# Patient Record
Sex: MALE | Race: WHITE | NOT HISPANIC OR LATINO | Employment: OTHER | URBAN - METROPOLITAN AREA
[De-identification: names, ages, dates, MRNs, and addresses within clinical notes are randomized per-mention and may not be internally consistent; named-entity substitution may affect disease eponyms.]

---

## 2019-11-06 ENCOUNTER — OFFICE VISIT (OUTPATIENT)
Dept: URGENT CARE | Facility: CLINIC | Age: 69
End: 2019-11-06
Payer: MEDICARE

## 2019-11-06 VITALS — OXYGEN SATURATION: 98 %

## 2019-11-06 DIAGNOSIS — S62.639A CLOSED AVULSION FRACTURE OF DISTAL PHALANX OF FINGER, INITIAL ENCOUNTER: Primary | ICD-10-CM

## 2019-11-06 DIAGNOSIS — M20.012 MALLET FINGER OF LEFT HAND: ICD-10-CM

## 2019-11-06 PROCEDURE — 99203 PR OFFICE/OUTPT VISIT, NEW, LEVL III, 30-44 MIN: ICD-10-PCS | Mod: S$GLB,,, | Performed by: FAMILY MEDICINE

## 2019-11-06 PROCEDURE — 73140 XR FINGER 2 OR MORE VIEWS LEFT: ICD-10-PCS | Mod: FY,LT,S$GLB, | Performed by: RADIOLOGY

## 2019-11-06 PROCEDURE — 99203 OFFICE O/P NEW LOW 30 MIN: CPT | Mod: S$GLB,,, | Performed by: FAMILY MEDICINE

## 2019-11-06 PROCEDURE — 73140 X-RAY EXAM OF FINGER(S): CPT | Mod: FY,LT,S$GLB, | Performed by: RADIOLOGY

## 2019-11-06 NOTE — PATIENT INSTRUCTIONS
Mallet Finger  You have an injury to your finger causing the tip of your finger to droop down. This makes your finger look like a small hammer or mallet. This is why its given this name. It is also called baseball finger. This injury happens when the tendon that holds the finger straight at the last joint tears. Sometimes a small break happens where this tendon attaches to the bone.  This causes local pain, swelling, and bruising. This injury usually takes about 4 to 6 weeks to heal. This injury is often treated with a special finger splint called a stack splint. It holds the tendon in the correct position. But even with right treatment, it may not be possible to fully straighten that joint after the injury heals. After healing, the joint will be stiff but usually recovers flexibility over time.  Home care  · Keep your arm elevated to reduce pain and swelling. When sitting or lying down elevate your arm above the level of your heart. You can do this by placing your arm on a pillow that rests on your chest or on a pillow at your side. This is most important during the first 48 hours after the injury.  · Put an ice pack over the injured area for 15 to 20 minutes every 3 to 6 hours. You should do this for the first 24 to 48 hours. You can make an ice pack by filling a plastic bag that seals at the top with ice cubes and then wrapping it with a thin towel. Be careful not to injure your skin with the ice treatments. Ice should never be applied directly to skin. Continue the use of ice packs for relief of pain and swelling as needed. After 48 hours, apply heat (warm shower or warm bath) for 15 to 20 minutes several times a day, or alternate ice and heat.  · If a splint was applied, keep it in place for the time advised. If you remove it too soon, it will cause the position of the tendon to change and the finger joint will heal in a bent position.  · You may use over-the-counter pain medicine to control pain, unless  another pain medicine was prescribed.Talk with your healthcare provider before using these medicines if you have chronic liver or kidney disease or ever had a stomach ulcer or GI bleeding.  · Most patients can return to normal activity while wearing the splint. Discuss any limitations with your healthcare provider.  Follow-up care  Follow up with your healthcare provider, or as advised.  If X-rays were taken, you will be told of any new findings that may affect your care.   When to seek medical advice  Call your healthcare provider right away if any of the following occur:  · Pain or swelling in the injured finger increases  · The finger becomes red or warm   · Injured finger becomes cold, blue, numb, or tingly  Date Last Reviewed: 11/23/2015  © 7780-9576 [employer]. [address] [place] [address], [place] [address]. All rights reserved. This information is not intended as a substitute for professional medical care. Always follow your healthcare professional's instructions.      KEEP THE SPLINT IN PLACE AT ALL TIMES, UNTIL YOU SEE THE HAND SPECIALIST.    I SUBMITTED AN EXPEDITED REFERRAL FOR YOU TO SEE THE HAND SPECIALIST.  IF YOU DO NOT HEAR FROM THEM, CALL [phone].      Make sure that you follow up with your primary care doctor in the next 2-5 days if needed .  Return to the Urgent Care if signs or symptoms change and certainly if you have worsening symptoms go to the nearest emergency department for further evaluation.

## 2019-11-06 NOTE — PROGRESS NOTES
Subjective:       Patient ID: Michael Veras is a 69 y.o. male.    Vitals:  height is 6' (1.829 m) (pended) and weight is 97.5 kg (215 lb) (pended). His tympanic temperature is 98.2 °F (36.8 °C) (pended). His blood pressure is 124/80 (pended) and his pulse is 90 (pended). His respiration is 18 (pended) and oxygen saturation is 98%.     Chief Complaint: Hand Pain    Patient presentS today with history left middle finger pain. Patient states he went was picking up something outside and a shooting pain went down left middle finger occurred an hour ago. Patient denies any pain now .  No other injury    Hand Pain    The incident occurred 1 to 3 hours ago. The incident occurred at home. The pain is present in the left fingers. The quality of the pain is described as aching and shooting. The patient is experiencing no pain. Pertinent negatives include no chest pain.       Constitution: Negative for chills, fatigue and fever.   HENT: Negative for congestion and sore throat.    Neck: Negative for painful lymph nodes.   Cardiovascular: Negative for chest pain and leg swelling.   Eyes: Negative for double vision and blurred vision.   Respiratory: Negative for cough and shortness of breath.    Gastrointestinal: Negative for nausea, vomiting and diarrhea.   Genitourinary: Negative for dysuria, frequency and urgency.   Musculoskeletal: Negative for joint pain, joint swelling, muscle cramps and muscle ache.   Skin: Negative for color change, pale and rash.   Allergic/Immunologic: Negative for seasonal allergies.   Neurological: Negative for dizziness, history of vertigo, light-headedness, passing out and headaches.   Hematologic/Lymphatic: Negative for swollen lymph nodes, easy bruising/bleeding and history of blood clots. Does not bruise/bleed easily.   Psychiatric/Behavioral: Negative for nervous/anxious, sleep disturbance and depression. The patient is not nervous/anxious.        Objective:      Physical Exam   Constitutional:  He is oriented to person, place, and time. He appears well-developed and well-nourished. No distress.   HENT:   Head: Normocephalic and atraumatic.   Eyes: Pupils are equal, round, and reactive to light. EOM are normal.   Neck: Normal range of motion.   Cardiovascular: Normal rate, regular rhythm and normal heart sounds.   Pulmonary/Chest: Effort normal and breath sounds normal. No stridor. No respiratory distress. He has no wheezes.   Musculoskeletal: He exhibits no edema or tenderness.   Mallet finger deformity noted, left middle finger.  Unable to extend distal phalanx  Changes of DJD noted to all fingers.   Neurological: He is alert and oriented to person, place, and time.   Skin: Skin is warm and not diaphoretic.   Psychiatric: He has a normal mood and affect. His behavior is normal. Thought content normal.   Vitals reviewed.      XRAY: There is a small chip fracture from the proximal aspect of the distal phalanx of the 3rd digit.  There is a flexion deformity suggesting extensor tendon injury.  Assessment:       1. Closed avulsion fracture of distal phalanx of finger, initial encounter    2. Mallet finger of left hand        Plan:         Closed avulsion fracture of distal phalanx of finger, initial encounter    Mallet finger of left hand  -     X-Ray Finger 2 or More Views Left; Future; Expected date: 11/06/2019  -     Ambulatory referral to Hand Surgery    KEEP THE SPLINT IN PLACE AT ALL TIMES, UNTIL YOU SEE THE HAND SPECIALIST.    I SUBMITTED AN EXPEDITED REFERRAL FOR YOU TO SEE THE HAND SPECIALIST.  IF YOU DO NOT HEAR FROM THEM, CALL [phone].      Make sure that you follow up with your primary care doctor in the next 2-5 days if needed .  Return to the Urgent Care if signs or symptoms change and certainly if you have worsening symptoms go to the nearest emergency department for further evaluation.

## 2019-11-07 ENCOUNTER — TELEPHONE (OUTPATIENT)
Dept: ORTHOPEDICS | Facility: CLINIC | Age: 69
End: 2019-11-07

## 2020-03-25 ENCOUNTER — HOSPITAL ENCOUNTER (EMERGENCY)
Facility: HOSPITAL | Age: 70
Discharge: HOME | End: 2020-03-26
Attending: EMERGENCY MEDICINE
Payer: MEDICARE

## 2020-03-25 DIAGNOSIS — F10.10 ALCOHOL ABUSE: ICD-10-CM

## 2020-03-25 DIAGNOSIS — F10.920 ALCOHOLIC INTOXICATION WITHOUT COMPLICATION: Primary | ICD-10-CM

## 2020-03-25 LAB
ERYTHROCYTE [DISTWIDTH] IN BLOOD BY AUTOMATED COUNT: 12.5 % (ref 11.5–14.5)
ETHANOL SERPL-MCNC: 137 MG/DL
HCT VFR BLD AUTO: 42.9 % (ref 40–54)
HGB BLD-MCNC: 14.3 G/DL (ref 14–18)
MCH RBC QN AUTO: 34.5 PG (ref 27–31)
MCHC RBC AUTO-ENTMCNC: 33.3 G/DL (ref 32–36)
MCV RBC AUTO: 103 FL (ref 82–98)
PLATELET # BLD AUTO: 162 K/UL (ref 150–350)
PMV BLD AUTO: 10.3 FL (ref 9.2–12.9)
RBC # BLD AUTO: 4.15 M/UL (ref 4.6–6.2)
WBC # BLD AUTO: 5.47 K/UL (ref 3.9–12.7)

## 2020-03-25 PROCEDURE — 80320 DRUG SCREEN QUANTALCOHOLS: CPT

## 2020-03-25 PROCEDURE — 99284 EMERGENCY DEPT VISIT MOD MDM: CPT

## 2020-03-25 PROCEDURE — 80053 COMPREHEN METABOLIC PANEL: CPT

## 2020-03-25 PROCEDURE — 85027 COMPLETE CBC AUTOMATED: CPT

## 2020-03-26 VITALS
HEART RATE: 92 BPM | WEIGHT: 185 LBS | OXYGEN SATURATION: 99 % | TEMPERATURE: 99 F | SYSTOLIC BLOOD PRESSURE: 165 MMHG | RESPIRATION RATE: 20 BRPM | HEIGHT: 74 IN | BODY MASS INDEX: 23.74 KG/M2 | DIASTOLIC BLOOD PRESSURE: 75 MMHG

## 2020-03-26 LAB
ALBUMIN SERPL BCP-MCNC: 4 G/DL (ref 3.5–5.2)
ALP SERPL-CCNC: 66 U/L (ref 55–135)
ALT SERPL W/O P-5'-P-CCNC: 46 U/L (ref 10–44)
AMPHET+METHAMPHET UR QL: NEGATIVE
ANION GAP SERPL CALC-SCNC: 16 MMOL/L (ref 8–16)
AST SERPL-CCNC: 101 U/L (ref 10–40)
BARBITURATES UR QL SCN>200 NG/ML: NEGATIVE
BENZODIAZ UR QL SCN>200 NG/ML: NORMAL
BILIRUB SERPL-MCNC: 0.5 MG/DL (ref 0.1–1)
BILIRUB UR QL STRIP: NEGATIVE
BUN SERPL-MCNC: 8 MG/DL (ref 8–23)
BZE UR QL SCN: NEGATIVE
CALCIUM SERPL-MCNC: 9.4 MG/DL (ref 8.7–10.5)
CANNABINOIDS UR QL SCN: NEGATIVE
CHLORIDE SERPL-SCNC: 109 MMOL/L (ref 95–110)
CLARITY UR: CLEAR
CO2 SERPL-SCNC: 18 MMOL/L (ref 23–29)
COLOR UR: YELLOW
CREAT SERPL-MCNC: 0.8 MG/DL (ref 0.5–1.4)
CREAT UR-MCNC: 114.6 MG/DL (ref 23–375)
EST. GFR  (AFRICAN AMERICAN): >60 ML/MIN/1.73 M^2
EST. GFR  (NON AFRICAN AMERICAN): >60 ML/MIN/1.73 M^2
ETHANOL SERPL-MCNC: 15 MG/DL
GLUCOSE SERPL-MCNC: 75 MG/DL (ref 70–110)
GLUCOSE UR QL STRIP: NEGATIVE
HGB UR QL STRIP: NEGATIVE
KETONES UR QL STRIP: NEGATIVE
LEUKOCYTE ESTERASE UR QL STRIP: NEGATIVE
METHADONE UR QL SCN>300 NG/ML: NEGATIVE
NITRITE UR QL STRIP: NEGATIVE
OPIATES UR QL SCN: NEGATIVE
PCP UR QL SCN>25 NG/ML: NEGATIVE
PH UR STRIP: 6 [PH] (ref 5–8)
POTASSIUM SERPL-SCNC: 4.8 MMOL/L (ref 3.5–5.1)
PROT SERPL-MCNC: 8 G/DL (ref 6–8.4)
PROT UR QL STRIP: NEGATIVE
SODIUM SERPL-SCNC: 143 MMOL/L (ref 136–145)
SP GR UR STRIP: 1.02 (ref 1–1.03)
TOXICOLOGY INFORMATION: NORMAL
URN SPEC COLLECT METH UR: NORMAL
UROBILINOGEN UR STRIP-ACNC: NEGATIVE EU/DL

## 2020-03-26 PROCEDURE — 80307 DRUG TEST PRSMV CHEM ANLYZR: CPT

## 2020-03-26 PROCEDURE — 80320 DRUG SCREEN QUANTALCOHOLS: CPT

## 2020-03-26 PROCEDURE — 25000003 PHARM REV CODE 250: Performed by: EMERGENCY MEDICINE

## 2020-03-26 PROCEDURE — 81003 URINALYSIS AUTO W/O SCOPE: CPT | Mod: 59

## 2020-03-26 RX ORDER — FOLIC ACID 1 MG/1
1 TABLET ORAL DAILY
Status: DISCONTINUED | OUTPATIENT
Start: 2020-03-26 | End: 2020-03-26 | Stop reason: HOSPADM

## 2020-03-26 RX ORDER — THIAMINE HCL 100 MG
100 TABLET ORAL DAILY
Status: DISCONTINUED | OUTPATIENT
Start: 2020-03-26 | End: 2020-03-26 | Stop reason: HOSPADM

## 2020-03-26 RX ADMIN — FOLIC ACID 1 MG: 1 TABLET ORAL at 04:03

## 2020-03-26 RX ADMIN — Medication 100 MG: at 04:03
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
## 2020-03-26 NOTE — ED NOTES
Pt placed in paperscrubs.  Security called to wand pt's belongings. Sitter in direct visual contact of patient.

## 2020-03-26 NOTE — ED NOTES
Security at bedside wanding patient's belongings.  Pt informed we needed a urine specimen.  Verbalized understanding.

## 2020-03-26 NOTE — ED NOTES
House supervisor called to see they have a  on call now.  House supervisor states they do not.  Dr. Prado notified.

## 2020-03-26 NOTE — ED NOTES
Patient identifiers for Michael Veras checked and correct.  LOC: The patient is awake, alert and aware of environment with an appropriate affect, the patient is oriented to person.  APPEARANCE: Patient resting comfortably and in no acute distress, patient is clean and well groomed, patient's clothing are properly fastened.  SKIN: The skin is warm and dry, patient has normal skin turgor and moist mucus membranes. Old scrapes to back.  MUSKULOSKELETAL: Patient moving all extremities well, no obvious swelling or deformities noted.  RESPIRATORY: Airway is open and patent, respirations are spontaneous, patient has a normal effort and rate.  CARDIAC: Patient has a normal rate, no periphreal edema noted

## 2020-03-26 NOTE — ED NOTES
Pt urinated on floor.  Refuses to stay in room at this time.  Security called and pt returned to room.

## 2020-03-26 NOTE — ED NOTES
Pt uncooperative, belligerent, confused. Pt ambulating in hallway and refusing to get back in bed.
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
## 2023-01-24 ENCOUNTER — HOSPITAL ENCOUNTER (EMERGENCY)
Facility: OTHER | Age: 73
Discharge: HOME | End: 2023-01-24
Attending: EMERGENCY MEDICINE
Payer: MEDICARE

## 2023-01-24 VITALS
TEMPERATURE: 98 F | BODY MASS INDEX: 23.75 KG/M2 | DIASTOLIC BLOOD PRESSURE: 79 MMHG | HEIGHT: 74 IN | RESPIRATION RATE: 20 BRPM | HEART RATE: 93 BPM | OXYGEN SATURATION: 95 % | SYSTOLIC BLOOD PRESSURE: 166 MMHG

## 2023-01-24 DIAGNOSIS — S01.01XA SCALP LACERATION, INITIAL ENCOUNTER: ICD-10-CM

## 2023-01-24 DIAGNOSIS — F10.929 ALCOHOLIC INTOXICATION WITH COMPLICATION: Primary | ICD-10-CM

## 2023-01-24 PROCEDURE — 99284 EMERGENCY DEPT VISIT MOD MDM: CPT | Mod: 25

## 2023-01-24 PROCEDURE — 12001 RPR S/N/AX/GEN/TRNK 2.5CM/<: CPT

## 2023-01-24 PROCEDURE — 25000003 PHARM REV CODE 250: Performed by: EMERGENCY MEDICINE

## 2023-01-24 RX ORDER — LIDOCAINE HYDROCHLORIDE 10 MG/ML
10 INJECTION INFILTRATION; PERINEURAL
Status: COMPLETED | OUTPATIENT
Start: 2023-01-24 | End: 2023-01-24

## 2023-01-24 RX ADMIN — LIDOCAINE HYDROCHLORIDE 10 ML: 10 INJECTION, SOLUTION INFILTRATION; PERINEURAL at 06:01

## 2023-01-24 RX ADMIN — Medication 1 ML: at 05:01

## 2023-01-24 NOTE — ED PROVIDER NOTES
Encounter Date: 1/24/2023    SCRIBE #1 NOTE: I, Yessi Rizo, am scribing for, and in the presence of,  Yessi Herrera MD.     History     Chief Complaint   Patient presents with    Alcohol Problem     Found down by bar, +ETOH per EMS.      Time seen by provider: 3:50 PM    This is a 73 y.o. male who presents to the ED after EMS found him laying on the floor near a local pub. The patient states that he has been drinking. EMS notes that the patient is having suicidal ideations. Of note, the patient's tetanus shot is up to date, last administered in 2019.     Minimal history obtainable secondary to intoxication.      The history is provided by the EMS personnel. The history is limited by the condition of the patient.   Review of patient's allergies indicates:  No Known Allergies  Past Medical History:   Diagnosis Date    Arthritis      History reviewed. No pertinent surgical history.  Family History   Problem Relation Age of Onset    No Known Problems Mother     No Known Problems Father      Social History     Tobacco Use    Smoking status: Never    Smokeless tobacco: Never   Substance Use Topics    Alcohol use: Yes     Comment: Everyday     Review of Systems   Unable to perform ROS: Other (intoxicated)     Physical Exam     Initial Vitals [01/24/23 1538]   BP Pulse Resp Temp SpO2   (!) 144/77 84 20 97.5 °F (36.4 °C) 95 %      MAP       --         Physical Exam    Constitutional: He appears well-developed. He is cooperative.   HENT:   Head: Atraumatic.   Punctate abrasion to right dorsal overlying MCP joint. 1 cm laceration to right forehead.   Eyes: Conjunctivae and lids are normal.   Neck:   Normal range of motion.  Cardiovascular:  Normal rate.           Musculoskeletal:         General: Normal range of motion.      Cervical back: Normal range of motion.     Neurological: He is alert.   Skin: No rash noted.   Punctate abrasion to left great toe   Psychiatric: His affect is blunt. His speech is slurred. He is  slowed.       ED Course   Lac Repair    Date/Time: 1/24/2023 6:14 PM  Performed by: Yessi Herrera MD  Authorized by: Yessi Herrera MD     Consent:     Consent given by:  Patient    Risks discussed:  Infection, poor cosmetic result and poor wound healing    Alternatives discussed:  Delayed treatment  Anesthesia:     Anesthesia method:  Topical application and local infiltration  Laceration details:     Location:  Scalp    Scalp location:  R temporal    Length (cm):  1.5  Pre-procedure details:     Preparation:  Patient was prepped and draped in usual sterile fashion  Exploration:     Limited defect created (wound extended): no    Treatment:     Area cleansed with:  Saline  Skin repair:     Repair method:  Sutures    Suture size:  4-0    Suture material:  Plain gut  Approximation:     Approximation:  Close  Repair type:     Repair type:  Simple  Post-procedure details:     Dressing:  Bulky dressing  Labs Reviewed - No data to display       Imaging Results              CT Cervical Spine Without Contrast (Final result)  Result time 01/24/23 17:35:48      Final result by Wojciech Valadez MD (01/24/23 17:35:48)                   Impression:      1. No acute abnormality of the cervical spine.  2. Multilevel chronic degenerative changes.  3. Paranasal sinus disease.      Electronically signed by: Wojciech Valadez  Date:    01/24/2023  Time:    17:35               Narrative:    EXAMINATION:  CT CERVICAL SPINE WITHOUT CONTRAST    CLINICAL HISTORY:  Neck trauma (Age >= 65y);    TECHNIQUE:  Low dose axial CT images through the cervical spine, with sagittal and coronal reformations.  Contrast was not administered.    COMPARISON:  None    FINDINGS:  No acute fractures of the cervical spine.  Alignment is satisfactory.    Moderate disc space narrowing at C5-6 with mild disc space narrowing elsewhere.    Prominent anterior bridging osteophytes at C3-4 and C4-5 and to a slightly lesser degree elsewhere throughout the cervical  spine.    Posterior elements are intact.  Mild facet arthropathy at C2-3 and C4-5.    Central canal appears adequately maintained.    Moderate bilateral foraminal narrowing at C4-5 and C5-6 on the right.    Limited evaluation of the intraspinal contents demonstrates no hematoma or mass.Paraspinal soft tissues exhibit no acute abnormalities.    Mild ethmoid and right maxillary sinus disease.  Air-fluid level in the right maxillary sinus.                                       CT Head Without Contrast (Final result)  Result time 01/24/23 17:05:38      Final result by Jair Garza MD (01/24/23 17:05:38)                   Impression:      1. High right frontal scalp suspected localized soft tissue swelling/contusion without displaced skull fracture or acute intracranial abnormality identified.  2. Bilateral inferior frontal lobe encephalomalacia likely sequela of prior trauma/infarction, unchanged.  3. Generalized cerebral volume loss and suspected sequela of chronic microvascular ischemic change.  Continued prominence of the lateral and 3rd ventricles possibly relating to component of central predominant volume loss versus normal pressure hydrocephalus.  4. Left caudate new remote appearing lacunar type infarct.  5. Right paranasal sinus disease.      Electronically signed by: Jair Garza MD  Date:    01/24/2023  Time:    17:05               Narrative:    EXAMINATION:  CT HEAD WITHOUT CONTRAST    CLINICAL HISTORY:  Head trauma, minor (Age >= 65y);    TECHNIQUE:  Low dose axial CT images obtained throughout the head without intravenous contrast. Sagittal and coronal reconstructions were performed.    COMPARISON:  Head CT 03/26/2020    FINDINGS:  Intracranial compartment:    Generalized cerebral volume loss.  There is continued prominence of the lateral and 3rd ventricles with the left slightly more so than the right, similar to prior.  No evidence of acute hydrocephalus.  No extra-axial blood or fluid  collections.    Unchanged focal area of encephalomalacia involving the left frontal lobe and to a lesser extent the inferior right frontal lobe likely reflecting sequela of prior trauma/infarction.  Grossly stable distribution of periventricular white matter hypoattenuation.  There is a small focus of volume loss at the left caudate likely remote lacunar-type infarct, new from 03/26/2020.  No parenchymal mass, hemorrhage, edema or acute major vascular distribution infarct.  Skull base atherosclerotic vascular calcifications noted.    Skull/extracranial contents (limited evaluation): Localized soft tissue swelling/contusion overlying the high right frontal calvarium.  No fracture. Patchy mucosal thickening of the right-sided paranasal sinuses.  Left paranasal sinuses and bilateral mastoid air cells are clear.  Imaged portions of the orbits are within normal limits.                                       Medications   LIDOcaine HCL 10 mg/ml (1%) injection 10 mL (10 mLs Infiltration Given by Provider 1/24/23 1820)   LETS (LIDOcaine-TETRAcaine-EPINEPHrine) gel solution 1 mL (1 mL Topical (Top) Given 1/24/23 1714)     Medical Decision Making:   History:   Old Medical Records: I decided to obtain old medical records.  Initial Assessment:   Patient with superficial abrasions following unwitnessed trauma in setting of alcohol use.  Will plan to observe the patient until clinically sober for discharge home.  Wound to right forehead repaired without issue- absorbable sutures placed.  6:53 PM  Pt ambulatory w steady gait, ok for dc home w wound care instructions discussed.   Clinical Tests:   Radiological Study: Ordered and Reviewed        Scribe Attestation:   Scribe #1: I performed the above scribed service and the documentation accurately describes the services I performed. I attest to the accuracy of the note.      Physician Attestation for Scribe: ISharon, reviewed documentation as scribed in my presence, which is  both accurate and complete.    ED Course as of 01/24/23 1853   Tue Jan 24, 2023   [id number] 73-year-old gentleman here by EMS following being found intoxicated outside a bar with small laceration to the right forehead, right hand.  Patient endorses drinking, but limited history provided.  Will plan to obtain head imaging, lack repair to right scalp, tetanus last administered in 2019.  Similar presentations in the past per epic review. [DM]      ED Course User Index  [DM] Yessi Herrera MD                 Clinical Impression:   Final diagnoses:  [F10.929] Alcoholic intoxication with complication (Primary)  [S01.01XA] Scalp laceration, initial encounter        ED Disposition Condition    Discharge Stable          ED Prescriptions    None       Follow-up Information       Follow up With Specialties Details Why Contact Info    Basil Levy MD Family Medicine Schedule an appointment as soon as possible for a visit   [address]   [address]  [facility] [address]  [phone]               Yessi Herrera MD  01/24/23 1853

## 2023-01-25 NOTE — ED TRIAGE NOTES
Pt reports to ED after being found down/passed out and intoxicated. Pt reports he drinks everyday. Small laceration to right side of forehead noted. Pt is slurring words. Awake and alert. Pt is forgetful and continues to ask where he is. States he does not remember where or how he fell. Denies headache or blurry vision.

## 2023-02-06 ENCOUNTER — HOSPITAL ENCOUNTER (EMERGENCY)
Facility: HOSPITAL | Age: 73
Discharge: PSYCHIATRIC OR SUBSTANCE USE FACILITY | End: 2023-02-07
Attending: EMERGENCY MEDICINE
Payer: MEDICARE

## 2023-02-06 DIAGNOSIS — F10.929 ALCOHOLIC INTOXICATION WITH COMPLICATION: Primary | ICD-10-CM

## 2023-02-06 LAB
BASOPHILS # BLD AUTO: 0.03 K/UL (ref 0–0.2)
BASOPHILS NFR BLD: 0.5 % (ref 0–1.9)
DIFFERENTIAL METHOD: ABNORMAL
EOSINOPHIL # BLD AUTO: 0.1 K/UL (ref 0–0.5)
EOSINOPHIL NFR BLD: 1.9 % (ref 0–8)
ERYTHROCYTE [DISTWIDTH] IN BLOOD BY AUTOMATED COUNT: 13.2 % (ref 11.5–14.5)
HCT VFR BLD AUTO: 49.3 % (ref 40–54)
HGB BLD-MCNC: 16.1 G/DL (ref 14–18)
IMM GRANULOCYTES # BLD AUTO: 0.01 K/UL (ref 0–0.04)
IMM GRANULOCYTES NFR BLD AUTO: 0.2 % (ref 0–0.5)
LYMPHOCYTES # BLD AUTO: 3.1 K/UL (ref 1–4.8)
LYMPHOCYTES NFR BLD: 49.3 % (ref 18–48)
MCH RBC QN AUTO: 32.3 PG (ref 27–31)
MCHC RBC AUTO-ENTMCNC: 32.7 G/DL (ref 32–36)
MCV RBC AUTO: 99 FL (ref 82–98)
MONOCYTES # BLD AUTO: 0.7 K/UL (ref 0.3–1)
MONOCYTES NFR BLD: 11 % (ref 4–15)
NEUTROPHILS # BLD AUTO: 2.3 K/UL (ref 1.8–7.7)
NEUTROPHILS NFR BLD: 37.1 % (ref 38–73)
NRBC BLD-RTO: 0 /100 WBC
PLATELET # BLD AUTO: 185 K/UL (ref 150–450)
PMV BLD AUTO: 10.1 FL (ref 9.2–12.9)
RBC # BLD AUTO: 4.99 M/UL (ref 4.6–6.2)
WBC # BLD AUTO: 6.25 K/UL (ref 3.9–12.7)

## 2023-02-06 PROCEDURE — 99284 EMERGENCY DEPT VISIT MOD MDM: CPT | Mod: 25

## 2023-02-06 PROCEDURE — 83690 ASSAY OF LIPASE: CPT | Performed by: EMERGENCY MEDICINE

## 2023-02-06 PROCEDURE — 25000003 PHARM REV CODE 250: Performed by: EMERGENCY MEDICINE

## 2023-02-06 PROCEDURE — 99285 EMERGENCY DEPT VISIT HI MDM: CPT | Mod: CS,,, | Performed by: EMERGENCY MEDICINE

## 2023-02-06 PROCEDURE — 99285 PR EMERGENCY DEPT VISIT,LEVEL V: ICD-10-PCS | Mod: CS,,, | Performed by: EMERGENCY MEDICINE

## 2023-02-06 PROCEDURE — U0002 COVID-19 LAB TEST NON-CDC: HCPCS | Performed by: EMERGENCY MEDICINE

## 2023-02-06 PROCEDURE — 87389 HIV-1 AG W/HIV-1&-2 AB AG IA: CPT | Performed by: EMERGENCY MEDICINE

## 2023-02-06 PROCEDURE — 82077 ASSAY SPEC XCP UR&BREATH IA: CPT | Performed by: EMERGENCY MEDICINE

## 2023-02-06 PROCEDURE — 81003 URINALYSIS AUTO W/O SCOPE: CPT | Performed by: EMERGENCY MEDICINE

## 2023-02-06 PROCEDURE — 86803 HEPATITIS C AB TEST: CPT | Performed by: EMERGENCY MEDICINE

## 2023-02-06 PROCEDURE — 96365 THER/PROPH/DIAG IV INF INIT: CPT

## 2023-02-06 PROCEDURE — 63600175 PHARM REV CODE 636 W HCPCS: Performed by: EMERGENCY MEDICINE

## 2023-02-06 PROCEDURE — 80053 COMPREHEN METABOLIC PANEL: CPT | Performed by: EMERGENCY MEDICINE

## 2023-02-06 PROCEDURE — 85025 COMPLETE CBC W/AUTO DIFF WBC: CPT | Performed by: EMERGENCY MEDICINE

## 2023-02-06 RX ORDER — MAG HYDROX/ALUMINUM HYD/SIMETH 200-200-20
5 SUSPENSION, ORAL (FINAL DOSE FORM) ORAL
Status: COMPLETED | OUTPATIENT
Start: 2023-02-06 | End: 2023-02-06

## 2023-02-06 RX ADMIN — ALUMINUM HYDROXIDE, MAGNESIUM HYDROXIDE, AND SIMETHICONE 5 ML: 200; 200; 20 SUSPENSION ORAL at 11:02

## 2023-02-06 RX ADMIN — THIAMINE HYDROCHLORIDE 100 MG: 100 INJECTION, SOLUTION INTRAMUSCULAR; INTRAVENOUS at 11:02

## 2023-02-07 VITALS
TEMPERATURE: 99 F | DIASTOLIC BLOOD PRESSURE: 88 MMHG | BODY MASS INDEX: 26.96 KG/M2 | HEART RATE: 103 BPM | WEIGHT: 210 LBS | RESPIRATION RATE: 18 BRPM | OXYGEN SATURATION: 95 % | SYSTOLIC BLOOD PRESSURE: 176 MMHG

## 2023-02-07 LAB
ALBUMIN SERPL BCP-MCNC: 4 G/DL (ref 3.5–5.2)
ALP SERPL-CCNC: 70 U/L (ref 55–135)
ALT SERPL W/O P-5'-P-CCNC: 60 U/L (ref 10–44)
ANION GAP SERPL CALC-SCNC: 16 MMOL/L (ref 8–16)
AST SERPL-CCNC: 122 U/L (ref 10–40)
BILIRUB SERPL-MCNC: 0.5 MG/DL (ref 0.1–1)
BILIRUB UR QL STRIP: NEGATIVE
BUN SERPL-MCNC: 6 MG/DL (ref 8–23)
CALCIUM SERPL-MCNC: 9 MG/DL (ref 8.7–10.5)
CHLORIDE SERPL-SCNC: 105 MMOL/L (ref 95–110)
CLARITY UR REFRACT.AUTO: CLEAR
CO2 SERPL-SCNC: 17 MMOL/L (ref 23–29)
COLOR UR AUTO: COLORLESS
CREAT SERPL-MCNC: 0.8 MG/DL (ref 0.5–1.4)
EST. GFR  (NO RACE VARIABLE): >60 ML/MIN/1.73 M^2
ETHANOL SERPL-MCNC: 273 MG/DL
ETHANOL SERPL-MCNC: 92 MG/DL
GLUCOSE SERPL-MCNC: 88 MG/DL (ref 70–110)
GLUCOSE UR QL STRIP: NEGATIVE
HCV AB SERPL QL IA: NORMAL
HGB UR QL STRIP: NEGATIVE
HIV 1+2 AB+HIV1 P24 AG SERPL QL IA: NORMAL
KETONES UR QL STRIP: NEGATIVE
LEUKOCYTE ESTERASE UR QL STRIP: NEGATIVE
LIPASE SERPL-CCNC: 33 U/L (ref 4–60)
NITRITE UR QL STRIP: NEGATIVE
PH UR STRIP: 6 [PH] (ref 5–8)
POTASSIUM SERPL-SCNC: 4 MMOL/L (ref 3.5–5.1)
PROT SERPL-MCNC: 7.7 G/DL (ref 6–8.4)
PROT UR QL STRIP: NEGATIVE
SARS-COV-2 RDRP RESP QL NAA+PROBE: NEGATIVE
SODIUM SERPL-SCNC: 138 MMOL/L (ref 136–145)
SP GR UR STRIP: 1 (ref 1–1.03)
URN SPEC COLLECT METH UR: ABNORMAL

## 2023-02-07 PROCEDURE — 82077 ASSAY SPEC XCP UR&BREATH IA: CPT | Performed by: EMERGENCY MEDICINE

## 2023-02-07 PROCEDURE — 25000003 PHARM REV CODE 250: Performed by: EMERGENCY MEDICINE

## 2023-02-07 PROCEDURE — 63600175 PHARM REV CODE 636 W HCPCS: Mod: JG | Performed by: EMERGENCY MEDICINE

## 2023-02-07 PROCEDURE — 96367 TX/PROPH/DG ADDL SEQ IV INF: CPT

## 2023-02-07 RX ORDER — FAMOTIDINE 20 MG/1
20 TABLET, FILM COATED ORAL
Status: COMPLETED | OUTPATIENT
Start: 2023-02-07 | End: 2023-02-07

## 2023-02-07 RX ORDER — ONDANSETRON 4 MG/1
4 TABLET, ORALLY DISINTEGRATING ORAL
Status: COMPLETED | OUTPATIENT
Start: 2023-02-07 | End: 2023-02-07

## 2023-02-07 RX ORDER — MAG HYDROX/ALUMINUM HYD/SIMETH 200-200-20
30 SUSPENSION, ORAL (FINAL DOSE FORM) ORAL
Status: DISCONTINUED | OUTPATIENT
Start: 2023-02-07 | End: 2023-02-07

## 2023-02-07 RX ORDER — DIAZEPAM 5 MG/1
10 TABLET ORAL
Status: COMPLETED | OUTPATIENT
Start: 2023-02-07 | End: 2023-02-07

## 2023-02-07 RX ORDER — MAG HYDROX/ALUMINUM HYD/SIMETH 200-200-20
30 SUSPENSION, ORAL (FINAL DOSE FORM) ORAL
Status: COMPLETED | OUTPATIENT
Start: 2023-02-07 | End: 2023-02-07

## 2023-02-07 RX ADMIN — DIAZEPAM 10 MG: 5 TABLET ORAL at 05:02

## 2023-02-07 RX ADMIN — FAMOTIDINE 20 MG: 20 TABLET, FILM COATED ORAL at 04:02

## 2023-02-07 RX ADMIN — ONDANSETRON 4 MG: 4 TABLET, ORALLY DISINTEGRATING ORAL at 05:02

## 2023-02-07 RX ADMIN — ALUMINUM HYDROXIDE, MAGNESIUM HYDROXIDE, AND SIMETHICONE 30 ML: 200; 200; 20 SUSPENSION ORAL at 03:02

## 2023-02-07 RX ADMIN — PHENOBARBITAL SODIUM 130 MG: 130 INJECTION INTRAMUSCULAR; INTRAVENOUS at 01:02

## 2023-02-07 NOTE — ED NOTES
Pt care assumed.  Pt sleeping, arouses easily to voice. No distress noted, denies any needs or c/o at this time.  Pt updated with plan of care,to return to [facility] when alcohol level is appropriate,  states understanding.  Side rails up.  Call button within reach.  Will continue to monitor.

## 2023-02-07 NOTE — ED PROVIDER NOTES
Encounter Date: 2/6/2023       History     Chief Complaint   Patient presents with    Alcohol Intoxication     C/o alcohol intoxication Pt here for medical clearance to go to [facility].      KRISTINA Rodriguez is a 73-year-old male with past medical history of alcohol abuse who presents for medical clearance from [facility] for ongoing intoxication.  Was brought there by a friend, he does want to get detox from alcohol.  He denies other drug use.  He states he has some abdominal pain, on review of systems he states he has some dark stools that has been going on at least 4 days, he is not sure how long.  He states he has not been eating or drinking much besides alcohol lately.  He denies any hematochezia.  Denies any vomiting.  Denies chest pain.  Had a fall a couple weeks ago and hit his head, did present to the ED at that time and had a head CT that was negative for bleed.  He states he does not have a history of seizures when he withdraws from alcohol.  He denies fevers or other symptoms of being ill lately.    Review of patient's allergies indicates:  No Known Allergies  Past Medical History:   Diagnosis Date    Arthritis      No past surgical history on file.  Family History   Problem Relation Age of Onset    No Known Problems Mother     No Known Problems Father      Social History     Tobacco Use    Smoking status: Never    Smokeless tobacco: Never   Substance Use Topics    Alcohol use: Yes     Comment: Everyday     Review of Systems  As above    Physical Exam     Initial Vitals [02/06/23 2040]   BP Pulse Resp Temp SpO2   (!) 158/78 78 18 98.5 °F (36.9 °C) 100 %      MAP       --         Physical Exam  General: Awake and alert, well-nourished  HENT: moist mucous membranes  Eyes: No conjunctival injection  Pulm: CTAB, no increased work of breathing  CV: Regular rate and rhythm, no murmur noted  Abdomen: Nondistended, non-tender to palpation  MSK: No LE edema  Skin: No rash noted  Neuro: No facial asymmetry, grossly  normal movements of arms and legs, speech slurred, GCS 15.  Psychiatric: Cooperative    ED Course   Procedures  Labs Reviewed   CBC W/ AUTO DIFFERENTIAL - Abnormal; Notable for the following components:       Result Value    MCV 99 (*)     MCH 32.3 (*)     Gran % 37.1 (*)     Lymph % 49.3 (*)     All other components within normal limits   COMPREHENSIVE METABOLIC PANEL - Abnormal; Notable for the following components:    CO2 17 (*)     BUN 6 (*)      (*)     ALT 60 (*)     All other components within normal limits   URINALYSIS, REFLEX TO URINE CULTURE - Abnormal; Notable for the following components:    Color, UA Colorless (*)     All other components within normal limits    Narrative:     Specimen Source->Urine   ALCOHOL,MEDICAL (ETHANOL) - Abnormal; Notable for the following components:    Alcohol, Serum 273 (*)     All other components within normal limits   ALCOHOL,MEDICAL (ETHANOL) - Abnormal; Notable for the following components:    Alcohol, Serum 92 (*)     All other components within normal limits   HIV 1 / 2 ANTIBODY    Narrative:     Release to patient->Immediate   HEPATITIS C ANTIBODY    Narrative:     Release to patient->Immediate   LIPASE   SARS-COV-2 RNA AMPLIFICATION, QUAL          Imaging Results    None          Medications   aluminum-magnesium hydroxide-simethicone 200-200-20 mg/5 mL suspension 5 mL (5 mLs Oral Given 2/6/23 2322)   thiamine (B-1) 100 mg in dextrose 5 % (D5W) 100 mL IVPB (0 mg Intravenous Stopped 2/7/23 0017)   phenobarbital (LUMINAL) 130 mg in sodium chloride 0.9% 100 mL IVPB (0 mg Intravenous Stopped 2/7/23 0130)   aluminum-magnesium hydroxide-simethicone 200-200-20 mg/5 mL suspension 30 mL (30 mLs Oral Given 2/7/23 0353)   famotidine tablet 20 mg (20 mg Oral Given 2/7/23 0408)   diazePAM tablet 10 mg (10 mg Oral Given 2/7/23 0517)   ondansetron disintegrating tablet 4 mg (4 mg Oral Given 2/7/23 0517)     Medical Decision Making:   Initial Assessment:   Pt with reassuring  vitals. Slurred speech but no focal deficits, appearing intoxicated.  Differential Diagnosis:   Alcohol intoxication, alcohol withdrawal, pancreatitis, hepatitis, biliary pathology, gastritis, melena, colitis, alcoholic ketoacidosis  ED Management:  Pt is pending labs, occult stool test ordered given description of dark stools, will see what his hgb is.  If labs reassuring pt will be able to go back to [facility] for detox.  He endorsed some occasional passive SI but is insistent that he would never actually hurt himself.  Pt signed out to Dr. Navarro for re-eval for sobriety and lab follow up.  Pt was given GI cocktail and thiamine.           ED Course as of 02/08/23 1252   Tue Feb 07, 2023   0018 Alcohol, Serum(!): 273 [DC]   0018 WBC: 6.25 [DC]   0018 Hemoglobin: 16.1 [DC]   0018 Platelets: 185 [DC]   0018 Lipase: 33 [DC]   0212 SARS-CoV-2 RNA, Amplification, Qual: Negative [DC]      ED Course User Index  [DC] Aly Navarro MD                 Clinical Impression:   Final diagnoses:  [F10.929] Alcoholic intoxication with complication (Primary)        ED Disposition Condition    Discharge Stable          ED Prescriptions    None       Follow-up Information       Follow up With Specialties Details Why Contact Info    [facility] Physical Therapy, Psychiatry, Behavioral Health, Geriatric Medicine   [address] [facility] [address]  [phone]      [facility] - Emergency Dept Emergency Medicine  Return to ED for worsening symptoms, inability to eat/drink, fever greater than 100.4, or any other concerns. [address] [facility] [id number] [phone]             Philippe Craven MD  02/08/23 8983

## 2023-02-07 NOTE — ED NOTES
Patient turned over to me by Dr. Navarro at 0600    Briefly:   73-year-old male is awaiting medical clearance for alcohol rehab.  Acutely intoxicated.  Second alcohol is less than 100.  No acute distress or altered mental status at bedside.  He is medically cleared to participate in rehab.  Will discharge to [facility].  Labs provided.  Did bedside teaching.  All questions answered.  Patient acknowledges understanding.      Francisco Esquivel MD  02/07/23 0876

## 2023-02-07 NOTE — ED NOTES
Received report, pt lying in bed, respirations even, unlabored, NAD noted, answering questions appropriately, requesting medication for anxiety, MD notified